# Patient Record
Sex: MALE | Race: ASIAN | NOT HISPANIC OR LATINO | URBAN - METROPOLITAN AREA
[De-identification: names, ages, dates, MRNs, and addresses within clinical notes are randomized per-mention and may not be internally consistent; named-entity substitution may affect disease eponyms.]

---

## 2024-11-18 ENCOUNTER — EMERGENCY (EMERGENCY)
Facility: HOSPITAL | Age: 54
LOS: 1 days | Discharge: HOME | End: 2024-11-18
Admitting: EMERGENCY MEDICINE
Payer: MEDICAID

## 2024-11-18 VITALS
RESPIRATION RATE: 18 BRPM | TEMPERATURE: 98 F | DIASTOLIC BLOOD PRESSURE: 98 MMHG | WEIGHT: 126.1 LBS | HEART RATE: 66 BPM | SYSTOLIC BLOOD PRESSURE: 161 MMHG | OXYGEN SATURATION: 100 %

## 2024-11-18 LAB
ALBUMIN SERPL ELPH-MCNC: 4.5 G/DL — SIGNIFICANT CHANGE UP (ref 3.3–5)
ALP SERPL-CCNC: 32 U/L — LOW (ref 40–120)
ALT FLD-CCNC: 17 U/L — SIGNIFICANT CHANGE UP (ref 4–41)
ANION GAP SERPL CALC-SCNC: 16 MMOL/L — HIGH (ref 7–14)
AST SERPL-CCNC: 22 U/L — SIGNIFICANT CHANGE UP (ref 4–40)
BASOPHILS # BLD AUTO: 0.02 K/UL — SIGNIFICANT CHANGE UP (ref 0–0.2)
BASOPHILS NFR BLD AUTO: 0.3 % — SIGNIFICANT CHANGE UP (ref 0–2)
BILIRUB SERPL-MCNC: 1 MG/DL — SIGNIFICANT CHANGE UP (ref 0.2–1.2)
BLD GP AB SCN SERPL QL: NEGATIVE — SIGNIFICANT CHANGE UP
BUN SERPL-MCNC: 20 MG/DL — SIGNIFICANT CHANGE UP (ref 7–23)
CALCIUM SERPL-MCNC: 9.2 MG/DL — SIGNIFICANT CHANGE UP (ref 8.4–10.5)
CHLORIDE SERPL-SCNC: 102 MMOL/L — SIGNIFICANT CHANGE UP (ref 98–107)
CO2 SERPL-SCNC: 23 MMOL/L — SIGNIFICANT CHANGE UP (ref 22–31)
CREAT SERPL-MCNC: 0.76 MG/DL — SIGNIFICANT CHANGE UP (ref 0.5–1.3)
EGFR: 107 ML/MIN/1.73M2 — SIGNIFICANT CHANGE UP
EOSINOPHIL # BLD AUTO: 0.06 K/UL — SIGNIFICANT CHANGE UP (ref 0–0.5)
EOSINOPHIL NFR BLD AUTO: 0.9 % — SIGNIFICANT CHANGE UP (ref 0–6)
GLUCOSE SERPL-MCNC: 98 MG/DL — SIGNIFICANT CHANGE UP (ref 70–99)
HCT VFR BLD CALC: 39.7 % — SIGNIFICANT CHANGE UP (ref 39–50)
HGB BLD-MCNC: 13.9 G/DL — SIGNIFICANT CHANGE UP (ref 13–17)
IANC: 3.27 K/UL — SIGNIFICANT CHANGE UP (ref 1.8–7.4)
IMM GRANULOCYTES NFR BLD AUTO: 0.2 % — SIGNIFICANT CHANGE UP (ref 0–0.9)
LYMPHOCYTES # BLD AUTO: 2.5 K/UL — SIGNIFICANT CHANGE UP (ref 1–3.3)
LYMPHOCYTES # BLD AUTO: 39 % — SIGNIFICANT CHANGE UP (ref 13–44)
MCHC RBC-ENTMCNC: 30.3 PG — SIGNIFICANT CHANGE UP (ref 27–34)
MCHC RBC-ENTMCNC: 35 G/DL — SIGNIFICANT CHANGE UP (ref 32–36)
MCV RBC AUTO: 86.7 FL — SIGNIFICANT CHANGE UP (ref 80–100)
MONOCYTES # BLD AUTO: 0.55 K/UL — SIGNIFICANT CHANGE UP (ref 0–0.9)
MONOCYTES NFR BLD AUTO: 8.6 % — SIGNIFICANT CHANGE UP (ref 2–14)
NEUTROPHILS # BLD AUTO: 3.27 K/UL — SIGNIFICANT CHANGE UP (ref 1.8–7.4)
NEUTROPHILS NFR BLD AUTO: 51 % — SIGNIFICANT CHANGE UP (ref 43–77)
NRBC # BLD: 0 /100 WBCS — SIGNIFICANT CHANGE UP (ref 0–0)
NRBC # FLD: 0 K/UL — SIGNIFICANT CHANGE UP (ref 0–0)
PLATELET # BLD AUTO: 249 K/UL — SIGNIFICANT CHANGE UP (ref 150–400)
POTASSIUM SERPL-MCNC: 3.9 MMOL/L — SIGNIFICANT CHANGE UP (ref 3.5–5.3)
POTASSIUM SERPL-SCNC: 3.9 MMOL/L — SIGNIFICANT CHANGE UP (ref 3.5–5.3)
PROT SERPL-MCNC: 7.2 G/DL — SIGNIFICANT CHANGE UP (ref 6–8.3)
RBC # BLD: 4.58 M/UL — SIGNIFICANT CHANGE UP (ref 4.2–5.8)
RBC # FLD: 11.7 % — SIGNIFICANT CHANGE UP (ref 10.3–14.5)
RH IG SCN BLD-IMP: POSITIVE — SIGNIFICANT CHANGE UP
SODIUM SERPL-SCNC: 141 MMOL/L — SIGNIFICANT CHANGE UP (ref 135–145)
WBC # BLD: 6.41 K/UL — SIGNIFICANT CHANGE UP (ref 3.8–10.5)
WBC # FLD AUTO: 6.41 K/UL — SIGNIFICANT CHANGE UP (ref 3.8–10.5)

## 2024-11-18 PROCEDURE — 99284 EMERGENCY DEPT VISIT MOD MDM: CPT

## 2024-11-18 RX ORDER — TRANEXAMIC ACID 650 MG/1
10 TABLET ORAL ONCE
Refills: 0 | Status: ACTIVE | OUTPATIENT
Start: 2024-11-18 | End: 2024-11-18

## 2024-11-18 NOTE — ED ADULT NURSE NOTE - OBJECTIVE STATEMENT
Pt received to intake room 2 a&ox4, ambulatory c/o dental bleeding to left wisdom tooth since 8pm. Pt Mandarin speaking. Son Aimee at bedside for translation per pt request. Pt scheduled for tooth extraction Tuesday. No past medical history. On assessment pt well appearing. Respirations even and unlabored. Airway patent. Pt denies chest pain, shortness of breath, lightheaded/dizziness. 20g IV placed to left AC. Labs collected and sent. Pending dental eval.

## 2024-11-18 NOTE — ED PROVIDER NOTE - TOOTH FINDINGS
left lower molar active bleeding noted moderately. no trauma noted, no surrrounding erythema warmth or tenderness.

## 2024-11-18 NOTE — ED PROVIDER NOTE - NSFOLLOWUPINSTRUCTIONS_ED_ALL_ED_FT
Please follow up with your Dentist as scheduled.       Tooth Injuries  Tooth injuries are injuries that happen because a strong force:  Cracked a tooth.  Moved a tooth out of place.  Knocked a tooth out of the mouth.  A tooth injury needs to be treated quickly to save the tooth.    What are the causes?  This condition may be caused by:  Anything that chips or breaks a tooth.  Anything that moves a tooth out of its place or knocks it out of the mouth.  The injuries may come from accidents, falls, or fights.    What increases the risk?  Playing sports without using a mouth guard. These sports include football or boxing.  Medical conditions that cause falling or fainting.  Anything that injures the face.  Medical conditions that weaken the root of the tooth.  What are the signs or symptoms?  The main symptoms of this condition are:  A tooth that has moved out of its place.  A tooth that has moved into the gums or out of the gums.  A tooth that you can no longer see because it is badly broken.  Other symptoms may include:  Pain when chewing.  A loose tooth.  Bleeding in the tooth.  Swelling or bruising of the tooth or lip.  Pain in the tooth when you eat or drink something cold or hot.  How is this treated?  Treatment includes:  Replacing a tooth fragment with a filling, a cap, or a hard cover (crown).  Repairing the inside of the tooth (root canal).  Putting the tooth back in its place, if it moved.  Using a brace or splint to hold the tooth in place.  Replacing the tooth that was knocked out of the mouth and then doing a root canal.  Removing the tooth completely, if it cannot be saved.  Taking medicine, including:  Medicine for pain.  Medicine to prevent infection.  Follow these instructions at home:  Medicines    Take over-the-counter and prescription medicines only as told by your doctor.  If you were prescribed an antibiotic medicine, take it as told by your doctor. Do not stop taking it even if you start to feel better.  Do not drive or use heavy machines while taking prescription pain medicine.  Managing pain and swelling    If told, put ice on your mouth near the injured tooth. To do this:  Put ice in a plastic bag.  Place a towel between your skin and the bag.  Leave the ice on for 20 minutes, 2–3 times a day.  Take off the ice if your skin turns bright red. This is very important. If you cannot feel pain, heat, or cold, you have a greater risk of damage to the area.  Gargle with a salt-water mixture 3–4 times a day. To make this, dissolve ½–1 tsp of salt in 1 cup of warm water.  Caring for your teeth      Do not eat or chew on very hard objects. These include ice cubes, pens, pencils, hard candy, and popcorn.  Do not use your teeth to open packages.  Do not clench or grind your teeth. Tell your doctor if you grind your teeth while you sleep.  Brush your teeth gently as told by your doctor.  Eat only soft foods as told by your doctor.  Always wear a mouth guard when you play contact sports.  General instructions    Do not use any products that contain nicotine or tobacco. These products include cigarettes, chewing tobacco, and vaping devices, such as e-cigarettes. These can delay incision healing after surgery. If you need help quitting, ask your health care provider.  Check the injured area every day for signs of infection. Watch for:  Redness, swelling, or pain.  Fluid, blood, or pus.  Keep all follow-up visits.  Contact a doctor if:  You continue to have tooth pain after taking medicine.  You have pus near the injured tooth.  You develop swelling near your injured tooth.  You have a tooth splint and it becomes loose.  You have a loose tooth.  Get help right away if:  Your face swells.  You have a fever.  You have bleeding near the tooth and it does not stop after 10 minutes.  You have trouble swallowing.  You are not able to open your mouth.  Your tooth comes out.  Summary  Tooth injuries are injuries that happen because something cracked the tooth or knocked it out of the mouth.  Causes of this injury include accidents, falls, or fights.  Treatment may need to be done quickly to save your tooth.  Your doctor will tell you how to care for your injured tooth. He or she will tell you how to take medicines and how to check for infection.  Call your doctor if there is bleeding or swelling near the injured tooth, or if you have a fever.  This information is not intended to replace advice given to you by your health care provider. Make sure you discuss any questions you have with your health care provider.

## 2024-11-18 NOTE — CONSULT NOTE ADULT - SUBJECTIVE AND OBJECTIVE BOX
Patient is a 54y old  Male who presents with a chief complaint of "Son said he has been bleeding for 3 hours plus and we have not been able to stop the bleeding. He has an upcoming appointment on Tuesday for extraction of a tooth on his lower left that has been causing him pain for a couple of months now. Patient points on lower left mandible.     HPI: Patient has been having on and off pain on lower left mandible. He has been bleeding for 3 hours plus. Bleeding from lower left back molar. Denies pain, blood thinner use, difficulty breathing/swallowing, Hx  dental bleeding. Dental was page. upon arrival Ed mentioned they tried using TXA to stop bleeding but it did not work and that patient has a really bad teeth with a huge decay. Labs was done and everything seems normal.         PAST MEDICAL & SURGICAL HISTORY:  Denies    MEDICATIONS  (STANDING):  tranexamic acid Injectable for Topical Use 10 milliLiter(s) Topical once    MEDICATIONS  (PRN): Tyleno/Motrin    Allergies  No Known Allergies      EOE:  TMJ - WNL             Facial bones and MOM <<grossly intact>> WNL             ( - ) trismus             ( - ) lymphadenopathy             ( - ) swelling             ( - ) asymmetry             ( + ) palpation - Bleeding and discomfort             ( - ) dyspnea             ( - ) dysphagia             ( - ) loss of consciousness    IOE:  <<permanent dentition: (+) caries #18, multiple missing teeth from previous extractions. (-) signs of infection/swelling/pathology           Hard/soft palate - WNL, ( - ) palatal torus, <<No pathology noted>>           Labial/buccal mucosa <<No pathology noted>>           ( + ) percussion - severe bleeding and tenderness           ( + ) palpation - Bleeding and discomfort           ( - ) swelling            ( - ) abscess           ( - ) sinus tract           (+)  Mobility #18 Grade III    *DENTAL RADIOGRAPHS: PAN    RADIOLOGY & ADDITIONAL STUDIES: (+) caries #18, (+) missing teeth #1, #16, 31, #32, mesially tilted tooth #17, (-) pathology    *ASSESSMENT: Unrestorable tooth #18 due to gross caries with grade III mobility, Mesially tilted tooth #17      Treatment done today:   Verbal and written consent given. Discussed findings with patient and son. RBA given. Patient and son understood that unless the tooth is taken out the bleeding will not stop and aspiration risk should be considered as well. Patient and son agreed to take the tooth out. The also mentioned the patient has an appointment with their dentist on Tuesday for wisdom tooth extraction. Extraction consent form was signed. Administered 2 carpules of 2% lidocaine 1:100K with epi via KENTON block and 1/4 carpule of 4% septocaine via infiltration and PDL. Anesthesia was achieved. Periosteal used to detached gingiva before extraction of tooth #18 was completed. Hemostasis was achieved. POIG in hard copy and verbally. All question answered and understood     RECOMMENDATIONS:  1) Pain management with motrin or tylenol.  2) Dental F/U with outpatient dentist for comprehensive dental care.   3) If prolong bleeding or any difficulty swallowing/breathing, fever occur, return to ER.     Niko Sy DDS, #[id number]

## 2024-11-18 NOTE — ED PROVIDER NOTE - CLINICAL SUMMARY MEDICAL DECISION MAKING FREE TEXT BOX
This is a 54-year-old female with no past medical history presented to the ED complaining having bleeding from the left molar.  Patient is scheduled removal of his tooth removed. Will do labs, and call Dental.

## 2024-11-18 NOTE — ED ADULT TRIAGE NOTE - CHIEF COMPLAINT QUOTE
C/o dental bleeding x 3 hours. endorsing bleeding to L lower back molar. denies pain, blood thinner use, difficulty breathing/swallowing, Hx

## 2024-11-18 NOTE — ED PROVIDER NOTE - PATIENT PORTAL LINK FT
You can access the FollowMyHealth Patient Portal offered by [facility] by registering at the following website: [email or web address]. By joining [facility]’s FollowMyHealth portal, you will also be able to view your health information using other applications (apps) compatible with our system.

## 2024-11-18 NOTE — ED ADULT TRIAGE NOTE - PAIN: PRESENCE, MLM
denies pain/discomfort (Rating = 0)
[facility]  Neurology  [address] [place] [address], [facility] [address]  [place] [address]  Phone: [phone]  Fax:   Follow Up Time: 1-3 Days

## 2024-11-18 NOTE — ED PROVIDER NOTE - OBJECTIVE STATEMENT
This is a 54-year-old female with no past medical history presented to the ED complaining having bleeding from the left molar.  Patient is scheduled removal of his tooth removed.  Patient states that he started bleeding for the last 3 hours has been bleeding moderately he has been going through many causes.  He denies having any difficulty eating drinking or swallowing.  He denies having any fever chills body aches headaches or dizziness.  She denies any chest pain shortness of breath or trouble breathing.  He denies taking any blood thinners and is not on any medications at this time. He admits to feeling dizziness denies having any liver issues in the past. denies having any drinking.
